# Patient Record
Sex: FEMALE | URBAN - METROPOLITAN AREA
[De-identification: names, ages, dates, MRNs, and addresses within clinical notes are randomized per-mention and may not be internally consistent; named-entity substitution may affect disease eponyms.]

---

## 2024-07-09 ENCOUNTER — TELEPHONE (OUTPATIENT)
Dept: FAMILY MEDICINE | Facility: CLINIC | Age: 44
End: 2024-07-09
Payer: COMMERCIAL

## 2024-07-09 NOTE — TELEPHONE ENCOUNTER
----- Message from Willie Montoya sent at 7/9/2024  2:31 PM CDT -----  Regarding: Deedee  Type:Patient Callback     Who called: Deedee     What is the request in detail: Pt stated that she has form to fax to the office. This will be her medical records from her fomer PCP. Please reach out to her with the correct fax number so that she can have it faxed over.     Can the clinic reply by [facility]? No      Would the patient rather a call back or a response via [facility]? Callback     Best call back number: [phone]      Additional Information:

## 2024-07-09 NOTE — TELEPHONE ENCOUNTER
Patient has appointment scheduled for 7/15/2024 with Dr. Bagley.  Would like to have past medical record faxed to office so that Dr. Bagley can review before visit.  Patient advised of fax number to office.

## 2024-07-15 ENCOUNTER — TELEPHONE (OUTPATIENT)
Dept: FAMILY MEDICINE | Facility: CLINIC | Age: 44
End: 2024-07-15
Payer: COMMERCIAL

## 2024-07-22 ENCOUNTER — OFFICE VISIT (OUTPATIENT)
Dept: FAMILY MEDICINE | Facility: CLINIC | Age: 44
End: 2024-07-22
Payer: COMMERCIAL

## 2024-07-22 VITALS
HEART RATE: 88 BPM | RESPIRATION RATE: 18 BRPM | TEMPERATURE: 98 F | SYSTOLIC BLOOD PRESSURE: 122 MMHG | DIASTOLIC BLOOD PRESSURE: 72 MMHG | WEIGHT: 119.06 LBS | OXYGEN SATURATION: 99 %

## 2024-07-22 DIAGNOSIS — Z01.419 WOMEN'S ANNUAL ROUTINE GYNECOLOGICAL EXAMINATION: ICD-10-CM

## 2024-07-22 DIAGNOSIS — E03.8 HYPOTHYROIDISM DUE TO HASHIMOTO'S THYROIDITIS: ICD-10-CM

## 2024-07-22 DIAGNOSIS — Z00.00 ANNUAL PHYSICAL EXAM: Primary | ICD-10-CM

## 2024-07-22 DIAGNOSIS — Z11.59 NEED FOR HEPATITIS C SCREENING TEST: ICD-10-CM

## 2024-07-22 DIAGNOSIS — Z12.11 COLON CANCER SCREENING: ICD-10-CM

## 2024-07-22 DIAGNOSIS — E06.3 HYPOTHYROIDISM DUE TO HASHIMOTO'S THYROIDITIS: ICD-10-CM

## 2024-07-22 DIAGNOSIS — Z11.4 SCREENING FOR HIV (HUMAN IMMUNODEFICIENCY VIRUS): ICD-10-CM

## 2024-07-22 DIAGNOSIS — Z12.31 BREAST CANCER SCREENING BY MAMMOGRAM: ICD-10-CM

## 2024-07-22 PROCEDURE — 99386 PREV VISIT NEW AGE 40-64: CPT | Mod: S$GLB,,, | Performed by: INTERNAL MEDICINE

## 2024-07-22 PROCEDURE — 1160F RVW MEDS BY RX/DR IN RCRD: CPT | Mod: CPTII,S$GLB,, | Performed by: INTERNAL MEDICINE

## 2024-07-22 PROCEDURE — 99999 PR PBB SHADOW E&M-EST. PATIENT-LVL IV: CPT | Mod: PBBFAC,,, | Performed by: INTERNAL MEDICINE

## 2024-07-22 PROCEDURE — 3078F DIAST BP <80 MM HG: CPT | Mod: CPTII,S$GLB,, | Performed by: INTERNAL MEDICINE

## 2024-07-22 PROCEDURE — 1159F MED LIST DOCD IN RCRD: CPT | Mod: CPTII,S$GLB,, | Performed by: INTERNAL MEDICINE

## 2024-07-22 PROCEDURE — 3074F SYST BP LT 130 MM HG: CPT | Mod: CPTII,S$GLB,, | Performed by: INTERNAL MEDICINE

## 2024-07-22 RX ORDER — LEVOTHYROXINE SODIUM 75 UG/1
75 TABLET ORAL
COMMUNITY
End: 2024-07-22 | Stop reason: CLARIF

## 2024-07-22 RX ORDER — LEVOTHYROXINE SODIUM 75 UG/1
75 TABLET ORAL
Qty: 90 TABLET | Refills: 3 | Status: SHIPPED | OUTPATIENT
Start: 2024-07-22 | End: 2025-07-22

## 2024-07-23 ENCOUNTER — LAB VISIT (OUTPATIENT)
Dept: LAB | Facility: HOSPITAL | Age: 44
End: 2024-07-23
Attending: INTERNAL MEDICINE
Payer: COMMERCIAL

## 2024-07-23 ENCOUNTER — TELEPHONE (OUTPATIENT)
Dept: FAMILY MEDICINE | Facility: CLINIC | Age: 44
End: 2024-07-23
Payer: COMMERCIAL

## 2024-07-23 DIAGNOSIS — Z11.59 NEED FOR HEPATITIS C SCREENING TEST: ICD-10-CM

## 2024-07-23 DIAGNOSIS — Z00.00 ANNUAL PHYSICAL EXAM: ICD-10-CM

## 2024-07-23 DIAGNOSIS — Z11.4 SCREENING FOR HIV (HUMAN IMMUNODEFICIENCY VIRUS): ICD-10-CM

## 2024-07-23 DIAGNOSIS — E06.3 HYPOTHYROIDISM DUE TO HASHIMOTO'S THYROIDITIS: ICD-10-CM

## 2024-07-23 DIAGNOSIS — E03.8 HYPOTHYROIDISM DUE TO HASHIMOTO'S THYROIDITIS: ICD-10-CM

## 2024-07-23 LAB
ALBUMIN SERPL BCP-MCNC: 3.9 G/DL (ref 3.5–5.2)
ALP SERPL-CCNC: 33 U/L (ref 55–135)
ALT SERPL W/O P-5'-P-CCNC: 12 U/L (ref 10–44)
ANION GAP SERPL CALC-SCNC: 6 MMOL/L (ref 8–16)
AST SERPL-CCNC: 16 U/L (ref 10–40)
BASOPHILS # BLD AUTO: 0.01 K/UL (ref 0–0.2)
BASOPHILS NFR BLD: 0.2 % (ref 0–1.9)
BILIRUB SERPL-MCNC: 0.3 MG/DL (ref 0.1–1)
BUN SERPL-MCNC: 16 MG/DL (ref 6–20)
CALCIUM SERPL-MCNC: 8.4 MG/DL (ref 8.7–10.5)
CHLORIDE SERPL-SCNC: 107 MMOL/L (ref 95–110)
CHOLEST SERPL-MCNC: 159 MG/DL (ref 120–199)
CHOLEST/HDLC SERPL: 2.3 {RATIO} (ref 2–5)
CO2 SERPL-SCNC: 24 MMOL/L (ref 23–29)
CREAT SERPL-MCNC: 0.8 MG/DL (ref 0.5–1.4)
DIFFERENTIAL METHOD BLD: NORMAL
EOSINOPHIL # BLD AUTO: 0.1 K/UL (ref 0–0.5)
EOSINOPHIL NFR BLD: 2.2 % (ref 0–8)
ERYTHROCYTE [DISTWIDTH] IN BLOOD BY AUTOMATED COUNT: 12.9 % (ref 11.5–14.5)
EST. GFR  (NO RACE VARIABLE): >60 ML/MIN/1.73 M^2
ESTIMATED AVG GLUCOSE: 91 MG/DL (ref 68–131)
GLUCOSE SERPL-MCNC: 82 MG/DL (ref 70–110)
HBA1C MFR BLD: 4.8 % (ref 4–5.6)
HCT VFR BLD AUTO: 38.9 % (ref 37–48.5)
HCV AB SERPL QL IA: NORMAL
HDLC SERPL-MCNC: 70 MG/DL (ref 40–75)
HDLC SERPL: 44 % (ref 20–50)
HGB BLD-MCNC: 12.6 G/DL (ref 12–16)
HIV 1+2 AB+HIV1 P24 AG SERPL QL IA: NORMAL
IMM GRANULOCYTES # BLD AUTO: 0.02 K/UL (ref 0–0.04)
IMM GRANULOCYTES NFR BLD AUTO: 0.3 % (ref 0–0.5)
LDLC SERPL CALC-MCNC: 79.8 MG/DL (ref 63–159)
LYMPHOCYTES # BLD AUTO: 1.7 K/UL (ref 1–4.8)
LYMPHOCYTES NFR BLD: 28.9 % (ref 18–48)
MCH RBC QN AUTO: 29.7 PG (ref 27–31)
MCHC RBC AUTO-ENTMCNC: 32.4 G/DL (ref 32–36)
MCV RBC AUTO: 92 FL (ref 82–98)
MONOCYTES # BLD AUTO: 0.6 K/UL (ref 0.3–1)
MONOCYTES NFR BLD: 9.9 % (ref 4–15)
NEUTROPHILS # BLD AUTO: 3.5 K/UL (ref 1.8–7.7)
NEUTROPHILS NFR BLD: 58.5 % (ref 38–73)
NONHDLC SERPL-MCNC: 89 MG/DL
NRBC BLD-RTO: 0 /100 WBC
PLATELET # BLD AUTO: 246 K/UL (ref 150–450)
PMV BLD AUTO: 10.4 FL (ref 9.2–12.9)
POTASSIUM SERPL-SCNC: 3.9 MMOL/L (ref 3.5–5.1)
PROT SERPL-MCNC: 7.1 G/DL (ref 6–8.4)
RBC # BLD AUTO: 4.24 M/UL (ref 4–5.4)
SODIUM SERPL-SCNC: 137 MMOL/L (ref 136–145)
T4 FREE SERPL-MCNC: 1 NG/DL (ref 0.71–1.51)
TRIGL SERPL-MCNC: 46 MG/DL (ref 30–150)
TSH SERPL DL<=0.005 MIU/L-ACNC: 1.14 UIU/ML (ref 0.4–4)
WBC # BLD AUTO: 5.98 K/UL (ref 3.9–12.7)

## 2024-07-23 PROCEDURE — 80061 LIPID PANEL: CPT | Performed by: INTERNAL MEDICINE

## 2024-07-23 PROCEDURE — 36415 COLL VENOUS BLD VENIPUNCTURE: CPT | Mod: PO | Performed by: INTERNAL MEDICINE

## 2024-07-23 PROCEDURE — 80053 COMPREHEN METABOLIC PANEL: CPT | Performed by: INTERNAL MEDICINE

## 2024-07-23 PROCEDURE — 85025 COMPLETE CBC W/AUTO DIFF WBC: CPT | Performed by: INTERNAL MEDICINE

## 2024-07-23 PROCEDURE — 86803 HEPATITIS C AB TEST: CPT | Performed by: INTERNAL MEDICINE

## 2024-07-23 PROCEDURE — 83036 HEMOGLOBIN GLYCOSYLATED A1C: CPT | Performed by: INTERNAL MEDICINE

## 2024-07-23 PROCEDURE — 84439 ASSAY OF FREE THYROXINE: CPT | Performed by: INTERNAL MEDICINE

## 2024-07-23 PROCEDURE — 87389 HIV-1 AG W/HIV-1&-2 AB AG IA: CPT | Performed by: INTERNAL MEDICINE

## 2024-07-23 PROCEDURE — 84443 ASSAY THYROID STIM HORMONE: CPT | Performed by: INTERNAL MEDICINE

## 2024-07-23 NOTE — PROGRESS NOTES
Chief Complaint: Establish Care (Pt would like to establish care and speak about a referral to endocrine  / high stress levels taking care of kids )      Deedee Ramirez  is a 43 y.o. year old patient who presents today for est care and annual    Patient has a history of hashimoto's, on Synthroid. Otherwise she will well but does have to take care of two children who have Autism.    Past Medical History:   Diagnosis Date    ADHD (attention deficit hyperactivity disorder)     Anxiety     Breast cancer     Depression     Hypothyroidism     PTSD (post-traumatic stress disorder)        History reviewed. No pertinent surgical history.     Family History   Problem Relation Name Age of Onset    Hypertension Mother      Depression Mother      Hyperlipidemia Mother      Pancreatic cancer Maternal Uncle          Social History     Socioeconomic History    Marital status:     Number of children: 2   Tobacco Use    Smoking status: Never    Smokeless tobacco: Never         Current Outpatient Medications:     levothyroxine (SYNTHROID) 75 MCG tablet, Take 1 tablet (75 mcg total) by mouth before breakfast., Disp: 90 tablet, Rfl: 3     Review of Systems   HENT:  Negative for congestion.    Eyes:  Negative for blurred vision.   Respiratory:  Negative for cough and shortness of breath.    Cardiovascular:  Negative for chest pain and leg swelling.   Gastrointestinal:  Negative for abdominal pain, blood in stool, constipation, diarrhea, melena and nausea.   Genitourinary:  Negative for dysuria.   Musculoskeletal:  Negative for joint pain.   Neurological:  Negative for headaches.   Psychiatric/Behavioral:  Negative for depression. The patient is not nervous/anxious.         Objective:      Vitals:    07/22/24 1518   BP: 122/72   Pulse: 88   Resp: 18   Temp: 98 °F (36.7 °C)       Physical Exam  Vitals and nursing note reviewed.   Constitutional:       Appearance: Normal appearance.   HENT:      Head: Normocephalic and atraumatic.    Cardiovascular:      Rate and Rhythm: Normal rate and regular rhythm.   Pulmonary:      Effort: Pulmonary effort is normal.      Breath sounds: Normal breath sounds. No wheezing or rales.   Abdominal:      Palpations: Abdomen is soft.      Tenderness: There is no abdominal tenderness.   Musculoskeletal:         General: Normal range of motion.      Right lower leg: No edema.      Left lower leg: No edema.   Skin:     General: Skin is warm and dry.   Neurological:      General: No focal deficit present.      Mental Status: She is alert and oriented to person, place, and time.   Psychiatric:         Mood and Affect: Mood normal.         Behavior: Behavior normal.          Assessment:       1. Annual physical exam    2. Hypothyroidism due to Hashimoto's thyroiditis    3. Women's annual routine gynecological examination    4. Screening for HIV (human immunodeficiency virus)    5. Need for hepatitis C screening test    6. Breast cancer screening by mammogram    7. Colon cancer screening          Plan:   1. Annual physical exam  Assessment & Plan:  Discussed HCM with patient  - Cologuard ordered  - annual labs ordered  - last pap smear on No result found, referral placed for Gyn for pap smear   - Mammogram: Met on 4/28/2023, mammogram ordered and scheduled   - advised patient to follow up with ophthalmologist and dentist every year  - reviewed medical, surgical, family and social history      Orders:  -     CBC Auto Differential; Future; Expected date: 07/22/2024  -     Comprehensive Metabolic Panel; Future; Expected date: 07/22/2024  -     Hemoglobin A1C; Future; Expected date: 07/22/2024  -     Lipid Panel; Future; Expected date: 07/22/2024    2. Hypothyroidism due to Hashimoto's thyroiditis  Assessment & Plan:  Chronic, stable     - TSH/T4  - cont synthroid 75 mcg daily    Orders:  -     Ambulatory referral/consult to Endocrinology; Future; Expected date: 07/29/2024  -     levothyroxine (SYNTHROID) 75 MCG tablet;  Take 1 tablet (75 mcg total) by mouth before breakfast.  Dispense: 90 tablet; Refill: 3  -     TSH; Future; Expected date: 07/22/2024  -     T4, Free; Future; Expected date: 07/22/2024    3. Women's annual routine gynecological examination  -     Ambulatory referral/consult to Gynecology; Future; Expected date: 07/29/2024    4. Screening for HIV (human immunodeficiency virus)  -     HIV 1/2 Ag/Ab (4th Gen); Future; Expected date: 07/22/2024    5. Need for hepatitis C screening test  -     Hepatitis C Antibody; Future; Expected date: 07/22/2024    6. Breast cancer screening by mammogram  -     Mammo Digital Screening Bilat w/ Wilfred; Future; Expected date: 07/22/2024    7. Colon cancer screening  -     Cologuard Screening (Multitarget Stool DNA); Future; Expected date: 07/22/2024         Follow up in about 1 year (around 7/22/2025).

## 2024-07-23 NOTE — ASSESSMENT & PLAN NOTE
Discussed HCM with patient  - Cologuard ordered  - annual labs ordered  - last pap smear on No result found, referral placed for Gyn for pap smear   - Mammogram: Met on 4/28/2023, mammogram ordered and scheduled   - advised patient to follow up with ophthalmologist and dentist every year  - reviewed medical, surgical, family and social history

## 2024-07-23 NOTE — TELEPHONE ENCOUNTER
Spoke to pharmacy regarding below; they were able to change to brand, medication not in stock so they will have to order; pt informed

## 2024-07-23 NOTE — TELEPHONE ENCOUNTER
----- Message from Trupti Bagley MD sent at 7/23/2024 10:37 AM CDT -----  Regarding: Synthroid  Please call patient's pharmacy and request Branded Synthroid. I tried placing it yesterday but it kept changing to generic. Thank you

## 2024-07-24 ENCOUNTER — PATIENT MESSAGE (OUTPATIENT)
Dept: FAMILY MEDICINE | Facility: CLINIC | Age: 44
End: 2024-07-24
Payer: COMMERCIAL

## 2024-09-04 ENCOUNTER — TELEPHONE (OUTPATIENT)
Dept: FAMILY MEDICINE | Facility: CLINIC | Age: 44
End: 2024-09-04
Payer: COMMERCIAL

## 2024-09-04 ENCOUNTER — TELEPHONE (OUTPATIENT)
Dept: OBSTETRICS AND GYNECOLOGY | Facility: CLINIC | Age: 44
End: 2024-09-04
Payer: COMMERCIAL

## 2024-09-04 NOTE — TELEPHONE ENCOUNTER
----- Message from Triston Haro sent at 9/4/2024 12:02 PM CDT -----  Regarding: self  Type: Patient Call Back    Who called:self    What is the request in detail:pt is calling to speak to the office to see of her medical records and referral was sent to a doctors office when I asked the reason    Can the clinic reply by [facility]?no    Would the patient rather a call back or a response via [facility]? callback    Best call back number:[phone]    Additional Information:

## 2024-09-04 NOTE — TELEPHONE ENCOUNTER
Pt call was returned.  Pt was giving the address to the office. Pt was asking if the provider needs her outside records before she can be seen.  Pt was told no she is go to come in  and if the provider needed anything we can request her records.     ----- Message from Carolina Crowell sent at 9/4/2024 12:14 PM CDT -----  Regarding: self [phone]  Type: Patient Call Back    Who called: self     What is the request in detail: pt is asking if office is needing her old medical records from prev doctors before she comes in?     Can the clinic reply by [facility]? No     Would the patient rather a call back or a response via [facility]? Call back     Best call back number: [phone]

## 2024-09-04 NOTE — TELEPHONE ENCOUNTER
Pt has appt with external endocrinologist on Monday and would like records sent; informed her I can fax most recent labs but if she wants her entire record she would need to go through medical record dept; pt verbalized understanding; ok with last labs being faxed

## 2024-10-21 PROBLEM — Z00.00 ANNUAL PHYSICAL EXAM: Status: RESOLVED | Noted: 2024-07-22 | Resolved: 2024-10-21

## 2024-11-15 ENCOUNTER — TELEPHONE (OUTPATIENT)
Dept: OBSTETRICS AND GYNECOLOGY | Facility: CLINIC | Age: 44
End: 2024-11-15
Payer: COMMERCIAL

## 2024-11-15 NOTE — TELEPHONE ENCOUNTER
Pt called to inform office that her child is sick and she may not come in on Monday.  She will call back on Monday morning to cancel is she is not able to come in at all. Pt was told that she can use the [facility] [employer] appt to cancel and reschedule her appt. Pt voiced understanding. livia    ----- Message from Ruby sent at 11/15/2024  3:25 PM CST -----  Regarding: Self  Type: Patient Call Back     What is the request in detail: Please call pt back with info on the time frame she needs to cancel her appt , her child is sick and she may not be able to make it on Monday.     Can the clinic reply by [facility]? No     Would the patient rather a call back or a response via [facility]? Call back    Best call back number: .[phone]      Additional Information:    Thank you.

## 2024-12-02 ENCOUNTER — TELEPHONE (OUTPATIENT)
Dept: OBSTETRICS AND GYNECOLOGY | Facility: CLINIC | Age: 44
End: 2024-12-02
Payer: COMMERCIAL

## 2024-12-02 NOTE — TELEPHONE ENCOUNTER
----- Message from Summer sent at 12/2/2024  3:15 PM CST -----  Regarding: appt  Type:  Patient Returning Call        Name of who is calling:Pt        What is request in detail:Pt is requesting a call back in regards to appt, pt states that she had a appt but cancelled it due to sick child and is trying to reschedule. Pt would like to seen if possible due to recent yeast infection that she had. Pt wants to make sure that it is all clear up.        Can clinic reply by [facility]:no        [facility]:[phone]

## 2024-12-03 ENCOUNTER — LAB VISIT (OUTPATIENT)
Dept: LAB | Facility: HOSPITAL | Age: 44
End: 2024-12-03
Attending: STUDENT IN AN ORGANIZED HEALTH CARE EDUCATION/TRAINING PROGRAM
Payer: COMMERCIAL

## 2024-12-03 ENCOUNTER — OFFICE VISIT (OUTPATIENT)
Dept: OBSTETRICS AND GYNECOLOGY | Facility: CLINIC | Age: 44
End: 2024-12-03
Payer: COMMERCIAL

## 2024-12-03 VITALS
DIASTOLIC BLOOD PRESSURE: 76 MMHG | HEIGHT: 59 IN | BODY MASS INDEX: 26.2 KG/M2 | WEIGHT: 129.94 LBS | SYSTOLIC BLOOD PRESSURE: 124 MMHG

## 2024-12-03 DIAGNOSIS — N76.0 ACUTE VULVOVAGINITIS: ICD-10-CM

## 2024-12-03 DIAGNOSIS — R30.0 DYSURIA: ICD-10-CM

## 2024-12-03 DIAGNOSIS — Z85.3 HISTORY OF BREAST CANCER: ICD-10-CM

## 2024-12-03 DIAGNOSIS — R15.9 INCONTINENCE OF FECES, UNSPECIFIED FECAL INCONTINENCE TYPE: ICD-10-CM

## 2024-12-03 DIAGNOSIS — Z01.419 WELL WOMAN EXAM WITH ROUTINE GYNECOLOGICAL EXAM: Primary | ICD-10-CM

## 2024-12-03 LAB
BACTERIA #/AREA URNS HPF: NORMAL /HPF
BILIRUB UR QL STRIP: NEGATIVE
CLARITY UR: CLEAR
COLOR UR: YELLOW
GLUCOSE UR QL STRIP: NEGATIVE
HGB UR QL STRIP: ABNORMAL
KETONES UR QL STRIP: NEGATIVE
LEUKOCYTE ESTERASE UR QL STRIP: ABNORMAL
MICROSCOPIC COMMENT: NORMAL
NITRITE UR QL STRIP: NEGATIVE
PH UR STRIP: 7 [PH] (ref 5–8)
PROT UR QL STRIP: NEGATIVE
RBC #/AREA URNS HPF: 2 /HPF (ref 0–4)
SP GR UR STRIP: 1.02 (ref 1–1.03)
SQUAMOUS #/AREA URNS HPF: 4 /HPF
URN SPEC COLLECT METH UR: ABNORMAL
UROBILINOGEN UR STRIP-ACNC: NEGATIVE EU/DL
WBC #/AREA URNS HPF: 2 /HPF (ref 0–5)

## 2024-12-03 PROCEDURE — 3078F DIAST BP <80 MM HG: CPT | Mod: CPTII,S$GLB,, | Performed by: STUDENT IN AN ORGANIZED HEALTH CARE EDUCATION/TRAINING PROGRAM

## 2024-12-03 PROCEDURE — 1159F MED LIST DOCD IN RCRD: CPT | Mod: CPTII,S$GLB,, | Performed by: STUDENT IN AN ORGANIZED HEALTH CARE EDUCATION/TRAINING PROGRAM

## 2024-12-03 PROCEDURE — 3074F SYST BP LT 130 MM HG: CPT | Mod: CPTII,S$GLB,, | Performed by: STUDENT IN AN ORGANIZED HEALTH CARE EDUCATION/TRAINING PROGRAM

## 2024-12-03 PROCEDURE — 99386 PREV VISIT NEW AGE 40-64: CPT | Mod: S$GLB,,, | Performed by: STUDENT IN AN ORGANIZED HEALTH CARE EDUCATION/TRAINING PROGRAM

## 2024-12-03 PROCEDURE — 99999 PR PBB SHADOW E&M-EST. PATIENT-LVL III: CPT | Mod: PBBFAC,,, | Performed by: STUDENT IN AN ORGANIZED HEALTH CARE EDUCATION/TRAINING PROGRAM

## 2024-12-03 PROCEDURE — 1160F RVW MEDS BY RX/DR IN RCRD: CPT | Mod: CPTII,S$GLB,, | Performed by: STUDENT IN AN ORGANIZED HEALTH CARE EDUCATION/TRAINING PROGRAM

## 2024-12-03 PROCEDURE — 3008F BODY MASS INDEX DOCD: CPT | Mod: CPTII,S$GLB,, | Performed by: STUDENT IN AN ORGANIZED HEALTH CARE EDUCATION/TRAINING PROGRAM

## 2024-12-03 PROCEDURE — 81000 URINALYSIS NONAUTO W/SCOPE: CPT | Performed by: STUDENT IN AN ORGANIZED HEALTH CARE EDUCATION/TRAINING PROGRAM

## 2024-12-03 PROCEDURE — 0352U VAGINOSIS SCREEN BY DNA PROBE: CPT | Performed by: STUDENT IN AN ORGANIZED HEALTH CARE EDUCATION/TRAINING PROGRAM

## 2024-12-03 PROCEDURE — 3044F HG A1C LEVEL LT 7.0%: CPT | Mod: CPTII,S$GLB,, | Performed by: STUDENT IN AN ORGANIZED HEALTH CARE EDUCATION/TRAINING PROGRAM

## 2024-12-03 RX ORDER — FLUCONAZOLE 150 MG/1
150 TABLET ORAL DAILY
Qty: 1 TABLET | Refills: 0 | Status: SHIPPED | OUTPATIENT
Start: 2024-12-03 | End: 2024-12-04

## 2024-12-03 NOTE — PROGRESS NOTES
Subjective     Patient ID: Deedee Ramirez is a 44 y.o. female.    Chief Complaint:  Well Woman (Yeast infection)      History of Present Illness  43 yo  presents to establish care and discuss vaginitis sx    Thinks she had a yeast infection recently, did not respond well to monistat 1. Today noticed some white residue on vulva   Also noting some burning with urination recently    History of triple negative breast cancer s/p chemo at age 33. Still having monthly cycles, usually predictable but for the most part 3-4 weeks apart.   Had tried menstrual discs before but did not work well for her. Now using tampons. Condoms for birth control. Denies pain with sex    Report some fecal incontinence that started after 2nd pregnancy, no urinary incontinence. Has not bothered her enough to where it's been a problem  History of ruptured ovarian cysts in the past. Had LEEP in 20s        GYN & OB History  Patient's last menstrual period was 11/10/2024.   Date of Last Pap: 12/3/2024    OB History    Para Term  AB Living   3 2     1 2   SAB IAB Ectopic Multiple Live Births                  # Outcome Date GA Lbr Carl/2nd Weight Sex Type Anes PTL Lv   3 Para 2017     Vag-Spont      2 Para 2013     Vag-Spont      1 AB                Review of Systems  Review of Systems   All other systems reviewed and are negative.         Objective   Physical Exam:   Constitutional: She appears well-developed and well-nourished.    HENT:   Head: Normocephalic and atraumatic.    Eyes: Conjunctivae and EOM are normal.      Pulmonary/Chest: Effort normal. Right breast exhibits no mass and no nipple discharge. Left breast exhibits no mass and no nipple discharge.   Well healed lumpectomy scar            Abdominal: Soft.     Genitourinary:    Uterus, right adnexa and left adnexa normal.      Pelvic exam was performed with patient in the lithotomy position.   The external female genitalia was normal.   There is no lesion on the right labia.  There is no lesion on the left labia. Cervix is normal. Right adnexum displays no mass. Left adnexum displays no mass. There is vaginal discharge in the vagina. Cervix exhibits friability.    pap smear completed   Genitourinary Comments: Female chaperone was present for duration of exam             Musculoskeletal: Normal range of motion.       Neurological: She is alert.    Skin: Skin is warm and dry.    Psychiatric: She has a normal mood and affect. Her behavior is normal.            Assessment and Plan     1. Well woman exam with routine gynecological exam    2. Dysuria    3. Acute vulvovaginitis    4. Incontinence of feces, unspecified fecal incontinence type        Plan:  1. Well woman exam with routine gynecological exam (Primary)  - Pap and HPV done today.  - Screening tests as ordered.  - Condom use encouraged for STD prevention.  Reviewed ASCCP Pap guidelines and screening recommendations.    Counseling: Contraception:condoms  HPV vaccine  Smoking  Health Screens: Mammography  Colonoscopy:discussed  Health Maintenance reviewed  Perimenopause/Menopause  - Liquid-Based Pap Smear, Screening  - HPV High Risk Genotypes, PCR  - discussed menopause sx, had severe hot flashes during chemo/radiation but not very bad right now.    2. Dysuria  - Urinalysis, Reflex to Urine Culture Urine, Clean Catch    3. Acute vulvovaginitis  - Vaginosis Screen by DNA Probe; Future  - fluconazole (DIFLUCAN) 150 MG Tab; Take 1 tablet (150 mg total) by mouth once daily. for 1 day  Dispense: 1 tablet; Refill: 0    4. Incontinence of feces, unspecified fecal incontinence type  - Ambulatory referral/consult to Physical/Occupational Therapy; Future    5. History of breast cancer  - Last saw Med Onc 4/2023 in [place], no longer needs speciality follow up, just CBE and mammo yearly.  - No longer needs MRI/mammo, just annual screening mammogram     Follow up in 1 year for WWE or sooner as needed     Goyo Narayan III, MD  [facility] [place] - OB GYN    [address]  [facility] [id number] [phone]

## 2024-12-04 LAB
BACTERIAL VAGINOSIS DNA: NOT DETECTED
CANDIDA GLABRATA/KRUSEI: NOT DETECTED
CANDIDA RRNA VAG QL PROBE: NOT DETECTED
TRICHOMONAS VAGINALIS: NOT DETECTED

## 2024-12-05 ENCOUNTER — PATIENT MESSAGE (OUTPATIENT)
Dept: OBSTETRICS AND GYNECOLOGY | Facility: CLINIC | Age: 44
End: 2024-12-05
Payer: COMMERCIAL

## 2025-01-09 ENCOUNTER — E-VISIT (OUTPATIENT)
Dept: OBSTETRICS AND GYNECOLOGY | Facility: CLINIC | Age: 45
End: 2025-01-09
Payer: COMMERCIAL

## 2025-01-09 ENCOUNTER — TELEPHONE (OUTPATIENT)
Dept: OBSTETRICS AND GYNECOLOGY | Facility: CLINIC | Age: 45
End: 2025-01-09
Payer: COMMERCIAL

## 2025-01-09 ENCOUNTER — PATIENT MESSAGE (OUTPATIENT)
Dept: OBSTETRICS AND GYNECOLOGY | Facility: CLINIC | Age: 45
End: 2025-01-09

## 2025-01-09 DIAGNOSIS — N89.8 VAGINAL ITCHING: Primary | ICD-10-CM

## 2025-01-09 RX ORDER — FLUCONAZOLE 150 MG/1
150 TABLET ORAL DAILY
Qty: 1 TABLET | Refills: 0 | Status: SHIPPED | OUTPATIENT
Start: 2025-01-09 | End: 2025-01-10

## 2025-01-09 NOTE — PROGRESS NOTES
Patient ID: Deedee Ramirez is a 44 y.o. female.    Chief Complaint: General Illness (Entered automatically based on patient selection in [employer].)    The patient initiated a request through [employer] on 1/9/2025 for evaluation and management with a chief complaint of General Illness (Entered automatically based on patient selection in [employer].)     I evaluated the questionnaire submission on 1/9/25.    [facility]    1/9/2025  9:31 AM CST - Filed by Patient   What do you need help with? Vaginal Concerns   Do you agree to participate in an E-Visit? Yes   If you have any of the following symptoms,  please do not complete an E-Visit,  schedule an appointment with your provider: I acknowledge   Do you have any of the following pregnancy-related conditions? None   What is the main issue you would like addressed today? Vaginal discomfort due to probable yeast infection   Which of the following vaginal concerns do you have? Itching;  Pain   Do you have vaginal discharge? No discharge   Do you have pain while passing urine? No   Do you have any of the following symptoms? None of the above    Have you taken antibiotics in the last two weeks? No    Do you use any of the following? No   Which of the following applies to your menstrual period? Expect soon   Which of the following applies to your menstrual cycle? Normal amount of bleeding   Do you have spotting between periods? No   Do you have pain with your period? No   Have you had similar symptoms in the past? More than once   When you had similar symptoms in the past, did any of the following work? Pills for yeast infection   Have you had a temperature of 100.4 or higher? No   Provide any additional information you feel is important. Would like fluconazole. Symptoms are similar to yeast infection I had in November that I treated with Monistat 1 because it occurred on a weekend. I prefer oral treatment because it is easier, , and my period is due  any time now.   Please attach any relevant images or files    Are you able to take your vital signs? No         Encounter Diagnosis   Name Primary?    Vaginal itching Yes        No orders of the defined types were placed in this encounter.     Medications Ordered This Encounter   Medications    fluconazole (DIFLUCAN) 150 MG Tab     Sig: Take 1 tablet (150 mg total) by mouth once daily. for 1 day     Dispense:  1 tablet     Refill:  0        No follow-ups on file.      E-Visit Time Tracking:    Day 1 Time (in minutes): 5    Total Time (in minutes): 5

## 2025-01-09 NOTE — TELEPHONE ENCOUNTER
Responded to pt via portal. Link for e-visit sent. Pt access liaison confirmed pt's preferred pharmacy.     ----- Message from Carolina sent at 2025  8:03 AM CST -----  [phone]  Type: Patient Call Back    Who called: self    What is the request in detail: pt stated she is experiencing symptoms of yeast infection, she asked if she can have an RX or if she needs to come in?     Can the clinic reply by [facility]? no    Would the patient rather a call back or a response via [facility]? Call back      Best call back number: [phone]      [facility]/pharmacy #[id number] - [place] - [address]  [address] [address]  Phone: [phone] Fax: [phone]

## 2025-01-13 ENCOUNTER — TELEPHONE (OUTPATIENT)
Dept: OBSTETRICS AND GYNECOLOGY | Facility: CLINIC | Age: 45
End: 2025-01-13
Payer: COMMERCIAL

## 2025-01-13 NOTE — TELEPHONE ENCOUNTER
"Called and spoke to pt. Scheduled appt for 1/14 at 9:15a. Will f/u with provider    ----- Message from Latricia sent at 1/13/2025  1:05 PM CST -----  Type:  Needs Medical Advice    Who Called: Patient  Best Call Back Number: [phone]  Additional Information: Patient is requesting a call back in regards to her evisit reply: as follows    "I took the fluconazole as soon as it was ready at the pharmacy on Thursday. I was in a lot of pain and did not notice improvement until Sunday morning, but it was a huge improvement. I do, however, still have some residual itching/discomfort. I was using Vagisil maximum strength until Sunday morning. Would you recommend another dose of fluconazole since I still have some residual symptoms? I also noticed you may be able to prescribe a more effective/appropriate topical anesthestic than Vagisil, and I would prefer that. Please note I've shown some tolerance to local anesthesia in the past and often need a larger and more frequent than normal dose, probably due to being a redhead."  "

## 2025-01-14 ENCOUNTER — OFFICE VISIT (OUTPATIENT)
Dept: OBSTETRICS AND GYNECOLOGY | Facility: CLINIC | Age: 45
End: 2025-01-14
Payer: COMMERCIAL

## 2025-01-14 VITALS — BODY MASS INDEX: 25.76 KG/M2 | WEIGHT: 127.56 LBS

## 2025-01-14 DIAGNOSIS — N92.6 IRREGULAR MENSTRUAL CYCLE: ICD-10-CM

## 2025-01-14 DIAGNOSIS — N76.0 ACUTE VULVOVAGINITIS: Primary | ICD-10-CM

## 2025-01-14 LAB
B-HCG UR QL: NEGATIVE
BACTERIA #/AREA URNS HPF: ABNORMAL /HPF
BILIRUB SERPL-MCNC: ABNORMAL MG/DL
BILIRUB UR QL STRIP: NEGATIVE
BLOOD URINE, POC: 50
CLARITY UR: ABNORMAL
CLARITY, POC UA: CLEAR
COLOR UR: YELLOW
COLOR, POC UA: ABNORMAL
CTP QC/QA: YES
GLUCOSE UR QL STRIP: ABNORMAL
GLUCOSE UR QL STRIP: NEGATIVE
HGB UR QL STRIP: ABNORMAL
KETONES UR QL STRIP: ABNORMAL
KETONES UR QL STRIP: NEGATIVE
LEUKOCYTE ESTERASE UR QL STRIP: ABNORMAL
LEUKOCYTE ESTERASE URINE, POC: ABNORMAL
MICROSCOPIC COMMENT: ABNORMAL
NITRITE UR QL STRIP: NEGATIVE
NITRITE, POC UA: ABNORMAL
PH UR STRIP: 6 [PH] (ref 5–8)
PH, POC UA: 7
PROT UR QL STRIP: NEGATIVE
PROTEIN, POC: ABNORMAL
RBC #/AREA URNS HPF: 11 /HPF (ref 0–4)
SP GR UR STRIP: 1.02 (ref 1–1.03)
SPECIFIC GRAVITY, POC UA: 1015
SQUAMOUS #/AREA URNS HPF: 25 /HPF
URN SPEC COLLECT METH UR: ABNORMAL
UROBILINOGEN UR STRIP-ACNC: NEGATIVE EU/DL
UROBILINOGEN, POC UA: ABNORMAL
WBC #/AREA URNS HPF: 56 /HPF (ref 0–5)

## 2025-01-14 PROCEDURE — 99214 OFFICE O/P EST MOD 30 MIN: CPT | Mod: S$GLB,,, | Performed by: STUDENT IN AN ORGANIZED HEALTH CARE EDUCATION/TRAINING PROGRAM

## 2025-01-14 PROCEDURE — 1160F RVW MEDS BY RX/DR IN RCRD: CPT | Mod: CPTII,S$GLB,, | Performed by: STUDENT IN AN ORGANIZED HEALTH CARE EDUCATION/TRAINING PROGRAM

## 2025-01-14 PROCEDURE — 3008F BODY MASS INDEX DOCD: CPT | Mod: CPTII,S$GLB,, | Performed by: STUDENT IN AN ORGANIZED HEALTH CARE EDUCATION/TRAINING PROGRAM

## 2025-01-14 PROCEDURE — 81000 URINALYSIS NONAUTO W/SCOPE: CPT | Performed by: STUDENT IN AN ORGANIZED HEALTH CARE EDUCATION/TRAINING PROGRAM

## 2025-01-14 PROCEDURE — 81025 URINE PREGNANCY TEST: CPT | Mod: S$GLB,,, | Performed by: STUDENT IN AN ORGANIZED HEALTH CARE EDUCATION/TRAINING PROGRAM

## 2025-01-14 PROCEDURE — 81002 URINALYSIS NONAUTO W/O SCOPE: CPT | Mod: S$GLB,,, | Performed by: STUDENT IN AN ORGANIZED HEALTH CARE EDUCATION/TRAINING PROGRAM

## 2025-01-14 PROCEDURE — 87086 URINE CULTURE/COLONY COUNT: CPT | Performed by: STUDENT IN AN ORGANIZED HEALTH CARE EDUCATION/TRAINING PROGRAM

## 2025-01-14 PROCEDURE — 1159F MED LIST DOCD IN RCRD: CPT | Mod: CPTII,S$GLB,, | Performed by: STUDENT IN AN ORGANIZED HEALTH CARE EDUCATION/TRAINING PROGRAM

## 2025-01-14 PROCEDURE — 81515 NFCT DS BV&VAGINITIS DNA ALG: CPT | Performed by: STUDENT IN AN ORGANIZED HEALTH CARE EDUCATION/TRAINING PROGRAM

## 2025-01-14 PROCEDURE — 99999 PR PBB SHADOW E&M-EST. PATIENT-LVL III: CPT | Mod: PBBFAC,,, | Performed by: STUDENT IN AN ORGANIZED HEALTH CARE EDUCATION/TRAINING PROGRAM

## 2025-01-14 RX ORDER — FLUCONAZOLE 150 MG/1
150 TABLET ORAL DAILY
Qty: 1 TABLET | Refills: 0 | Status: SHIPPED | OUTPATIENT
Start: 2025-01-14 | End: 2025-01-15

## 2025-01-14 RX ORDER — FLUCONAZOLE 150 MG/1
150 TABLET ORAL
Qty: 2 TABLET | Refills: 0 | Status: SHIPPED | OUTPATIENT
Start: 2025-01-14 | End: 2025-01-18

## 2025-01-14 RX ORDER — TERCONAZOLE 4 MG/G
1 CREAM VAGINAL NIGHTLY
Qty: 45 G | Refills: 0 | Status: SHIPPED | OUTPATIENT
Start: 2025-01-14 | End: 2025-01-24

## 2025-01-14 NOTE — PROGRESS NOTES
Subjective     Patient ID: Deedee Ramirez is a 44 y.o. female.    Chief Complaint:  Vaginitis      History of Present Illness  45 yo presents to discuss vaginitis sx    Took one dose of fluconazole as prescribed and did not feel relief until a few days later. Also using vagisil itch relief max strength  Feels better now but still experiencing itching/irritation.     Using condoms and water based lubes, trying to find ones without glycerine or other additives that can be triggering    Also noting she is 11 days late for her cycle. Wondering if this is due to perimenopause. History of chemo/triple negative BC 10 years ago      GYN & OB History  Patient's last menstrual period was 2024.   Date of Last Pap: 2024    OB History    Para Term  AB Living   3 2     1 2   SAB IAB Ectopic Multiple Live Births                  # Outcome Date GA Lbr Carl/2nd Weight Sex Type Anes PTL Lv   3 Para 2017     Vag-Spont      2 Para 2013     Vag-Spont      1 AB                Review of Systems  Review of Systems   Genitourinary:  Positive for vaginal discharge, vaginal pain and vaginal dryness.   All other systems reviewed and are negative.         Objective   Physical Exam:   Constitutional: She appears well-developed and well-nourished.    HENT:   Head: Normocephalic and atraumatic.    Eyes: EOM are normal.      Pulmonary/Chest: Effort normal.        Abdominal: Soft.     Genitourinary:    Right adnexa and left adnexa normal.      Pelvic exam was performed with patient in the lithotomy position.   The external female genitalia was normal.   Genitalia hair distrobution normal .   There is no lesion on the right labia. There is no lesion on the left labia. There is tenderness in the vagina.    Genitourinary Comments: Female chaperone present for duration of exam             Musculoskeletal: Normal range of motion.       Neurological: She is alert.    Skin: Skin is warm and dry.    Psychiatric: She has a normal mood  and affect.         Recent Labs   Lab Result Units 01/14/25  0934   POC Preg Test, Ur  Negative          Assessment and Plan     1. Acute vulvovaginitis    2. Irregular menstrual cycle          Plan:  1. Acute vulvovaginitis (Primary)  - will prescribe 3 day course of fluconazole and external cream. Trial of topical lidocaine  - discussed alternative options such as boric acid suppositories, reviewed ACOG statement on topical hormone treatment in the setting of hormone sensitive breast cancers (pt had negative receptor status 10 years ago)  - continue with glycerine/additive free lubes if possible.   - Vulvovaginal hygiene and care measures reviewed including: wearing cotton underwear, avoiding feminine products and irritants such as tight synthetic clothing, damp clothing     - Vaginosis Screen by DNA Probe; Future  - fluconazole (DIFLUCAN) 150 MG Tab; Take 1 tablet (150 mg total) by mouth once daily. for 1 day  Dispense: 1 tablet; Refill: 0  - LIDOcaine 4 % Gel; Apply 1 each topically 2 (two) times a day.  Dispense: 30 g  - terconazole (TERAZOL 7) 0.4 % Crea; Place 1 applicator vaginally every evening. Apply externally to affected area twice daily for 10 days  Dispense: 45 g; Refill: 0  - Urinalysis, Reflex to Urine Culture Urine, Clean Catch  - Vaginosis Screen by DNA Probe    2. Irregular menstrual cycle  - UPT neg today, continue to monitor menstrual cycles  - POCT URINE DIPSTICK WITHOUT MICROSCOPE  - POCT Urine Pregnancy      Follow up as needed     Goyo Narayan III, MD  [facility] - OB GYN   [address] [place] [id number] [phone]

## 2025-01-16 LAB
BACTERIA UR CULT: NORMAL
BACTERIAL VAGINOSIS DNA: NOT DETECTED
CANDIDA GLABRATA/KRUSEI: NOT DETECTED
CANDIDA RRNA VAG QL PROBE: NOT DETECTED
TRICHOMONAS VAGINALIS: NOT DETECTED

## 2025-02-10 ENCOUNTER — E-VISIT (OUTPATIENT)
Dept: OBSTETRICS AND GYNECOLOGY | Facility: CLINIC | Age: 45
End: 2025-02-10
Payer: COMMERCIAL

## 2025-02-10 DIAGNOSIS — N76.0 ACUTE VULVOVAGINITIS: Primary | ICD-10-CM

## 2025-02-10 RX ORDER — FLUCONAZOLE 150 MG/1
150 TABLET ORAL
Qty: 2 TABLET | Refills: 0 | Status: SHIPPED | OUTPATIENT
Start: 2025-02-10 | End: 2025-02-14

## 2025-02-10 NOTE — PROGRESS NOTES
Patient ID: Deedee Ramirez is a 44 y.o. female.    Chief Complaint: General Illness (Entered automatically based on patient selection in [email or web address].)    The patient initiated a request through [email or web address] on 2/10/2025 for evaluation and management with a chief complaint of General Illness (Entered automatically based on patient selection in [email or web address].)     I evaluated the questionnaire submission on 2/10/25.    [facility] P[facility]    2/10/2025  8:05 AM CST - Filed by Patient   What do you need help with? Vaginal Concerns   Do you agree to participate in an E-Visit? Yes   If you have any of the following symptoms,  please do not complete an E-Visit,  schedule an appointment with your provider: I acknowledge   Do you have any of the following pregnancy-related conditions? None   What is the main issue you would like addressed today? yeast infection   Which of the following vaginal concerns do you have? Discharge;  Itching;  Pain   Do you have vaginal discharge? White/milky discharge   Do you have pain while passing urine? No   Do you have any of the following symptoms? None of the above    Have you taken antibiotics in the last two weeks? No    Do you use any of the following? No   Which of the following applies to your menstrual period? Periods are unpredictable   Which of the following applies to your menstrual cycle? Normal amount of bleeding   Do you have spotting between periods? No   Do you have pain with your period? No   Have you had similar symptoms in the past? Frequently   When you had similar symptoms in the past, did any of the following work? Pills for yeast infection   Have you had a temperature of 100.4 or higher? No   Provide any additional information you feel is important. Symptoms are currently mild. I would like a dose of fluconazole as soon as possible, so they don't worsen.   Please attach any relevant images or files    Are you able to take your vital signs? No         Encounter Diagnosis    Name Primary?    Acute vulvovaginitis Yes        No orders of the defined types were placed in this encounter.     Medications Ordered This Encounter   Medications    fluconazole (DIFLUCAN) 150 MG Tab     Sig: Take 1 tablet (150 mg total) by mouth every 72 hours. for 2 doses     Dispense:  2 tablet     Refill:  0        No follow-ups on file.      E-Visit Time Tracking:

## 2025-02-19 ENCOUNTER — PATIENT MESSAGE (OUTPATIENT)
Dept: OBSTETRICS AND GYNECOLOGY | Facility: CLINIC | Age: 45
End: 2025-02-19
Payer: COMMERCIAL

## 2025-02-19 ENCOUNTER — OFFICE VISIT (OUTPATIENT)
Dept: OBSTETRICS AND GYNECOLOGY | Facility: CLINIC | Age: 45
End: 2025-02-19
Payer: COMMERCIAL

## 2025-02-19 VITALS
WEIGHT: 120.56 LBS | DIASTOLIC BLOOD PRESSURE: 82 MMHG | HEIGHT: 59 IN | BODY MASS INDEX: 24.31 KG/M2 | SYSTOLIC BLOOD PRESSURE: 124 MMHG

## 2025-02-19 DIAGNOSIS — N95.8 GENITOURINARY SYNDROME OF MENOPAUSE: ICD-10-CM

## 2025-02-19 DIAGNOSIS — C50.919 TRIPLE NEGATIVE BREAST CANCER: ICD-10-CM

## 2025-02-19 DIAGNOSIS — N76.0 ACUTE VULVOVAGINITIS: Primary | ICD-10-CM

## 2025-02-19 DIAGNOSIS — Z17.421 TRIPLE NEGATIVE BREAST CANCER: ICD-10-CM

## 2025-02-19 PROCEDURE — 81515 NFCT DS BV&VAGINITIS DNA ALG: CPT | Performed by: STUDENT IN AN ORGANIZED HEALTH CARE EDUCATION/TRAINING PROGRAM

## 2025-02-19 RX ORDER — CLOTRIMAZOLE AND BETAMETHASONE DIPROPIONATE 10; .64 MG/G; MG/G
CREAM TOPICAL 2 TIMES DAILY
Qty: 45 G | Refills: 1 | Status: SHIPPED | OUTPATIENT
Start: 2025-02-19

## 2025-02-19 RX ORDER — FLUCONAZOLE 150 MG/1
150 TABLET ORAL DAILY
Qty: 1 TABLET | Refills: 0 | Status: SHIPPED | OUTPATIENT
Start: 2025-02-19 | End: 2025-02-20

## 2025-02-19 NOTE — PROGRESS NOTES
Subjective     Patient ID: Deedee Ramirez is a 44 y.o. female.    Chief Complaint:  Gynecologic Exam (Yeast)      History of Present Illness  43 yo presents to discuss vaginitis sx and vulvar irritatoin    Today concerned about external vulvar skin burning, symptoms have largely resolved internally.   Sx currently worse externally, feels like sunburn. Concerned that there may be an infectious cause     Since last appointment:  Yeast infection cleared up but it took some time after 4 doses of fluconazole  Had a menstrual cycle on , it was 18 days late but resembled a usual period  Had sx of yeast infection again and followed up an Evisit to discuss this on 2/10, took 2 doses of fluconazole and has not had much relief.     Reports history of HSV-1 positive serology when she was pregnant. Once had a suspicious vulvar/vaginal lesion but said it was negative for herpes    Does not note any hot flashes yet but thinks she's experienced mood swings  Is concerned about applying a vaginal estrogen cream regularly and messy products like a cream. Does not like the idea of having to apply something regularly for a long time   Had issues with menstrual discs staying in place and is also not sure if a vaginal estrogen ring would work for her          GYN & OB History  Patient's last menstrual period was 2025.   Date of Last Pap: 2024    OB History    Para Term  AB Living   3 2   1 2   SAB IAB Ectopic Multiple Live Births             # Outcome Date GA Lbr Carl/2nd Weight Sex Type Anes PTL Lv   3 Para 2017     Vag-Spont      2 Para 2013     Vag-Spont      1 AB                Review of Systems  Review of Systems   Genitourinary:  Positive for vaginal pain.   All other systems reviewed and are negative.         Objective   Physical Exam:   Constitutional: She appears well-developed and well-nourished.    HENT:   Head: Normocephalic and atraumatic.    Eyes: EOM are normal.      Pulmonary/Chest: Effort  normal.        Abdominal: Soft.     Genitourinary:    Vagina, uterus, right adnexa and left adnexa normal.      Pelvic exam was performed with patient in the lithotomy position.   The external female genitalia was normal.   Genitalia hair distrobution normal .   There is no lesion on the right labia. There is no lesion on the left labia. Cervix is normal.    Genitourinary Comments: Female chaperone present for duration of exam    No lesions concerning for herpes outbreak noted             Musculoskeletal: Normal range of motion.       Neurological: She is alert.    Skin: Skin is warm and dry.    Psychiatric: She has a normal mood and affect.            Assessment and Plan     1. Acute vulvovaginitis    2. Genitourinary syndrome of menopause    3. Triple negative breast cancer        Plan:  1. Acute vulvovaginitis (Primary)  - clotrimazole-betamethasone 1-0.05% (LOTRISONE) cream; Apply topically 2 (two) times daily.  Dispense: 45 g; Refill: 1  - Vaginosis Screen by DNA Probe  - fluconazole (DIFLUCAN) 150 MG Tab; Take 1 tablet (150 mg total) by mouth once daily. for 1 day  Dispense: 1 tablet; Refill: 0    2. Genitourinary syndrome of menopause  - Discussed at length that patient's persistent symptoms not negative vaginal infectious swab raises suspicion for GSM. Discussed pathophysiology of this condition and that estrogen replacement in the form of cream, tablet, ring is often very beneficial and can reduce incidence of yeast infections if this is the cause of her symptoms.  - discussed that systemic HRT would not be as effective as local topical treatment for these symptoms, also as she is still having menstrual cycles and not having vasomotor symptoms, unclear if she would benefit from traditional HRT.   - Pt does not like the idea of having to apply a medication regularly for symptom relief.   - Had a mallory discussion that improvement in sx may take some work on her end and that I recommend referral to woman's  survivorship to discuss navigating menopause and perimenopausal symptoms in the setting remote history of breast cancer and chemotherapy.  - Pt also reports she has several events coming up that may make it difficult to start daily topical estrogen for 2 weeks, would like to consider waiting until after [place] and trying out the vaginal ring and applying the estrogen cream externally as needed.  - discussed role of probiotics, vaginal moisturizers and lubricants. Uses condoms for birth control and is concerned about any ingredients in vaginal estrogen interfering with this.  - Ambulatory referral/consult to Women's Wellness and Survivorship; Future    3. Triple negative breast cancer  - Ambulatory referral/consult to Women's Wellness and Survivorship; Future      Follow up as needed     Goyo Narayan III, MD  [facility] - [place] - OB GYN   [address]  [place] [id number] [phone]

## 2025-02-20 ENCOUNTER — PATIENT MESSAGE (OUTPATIENT)
Dept: OBSTETRICS AND GYNECOLOGY | Facility: CLINIC | Age: 45
End: 2025-02-20
Payer: COMMERCIAL

## 2025-02-26 ENCOUNTER — RESULTS FOLLOW-UP (OUTPATIENT)
Dept: OBSTETRICS AND GYNECOLOGY | Facility: CLINIC | Age: 45
End: 2025-02-26

## 2025-03-06 ENCOUNTER — CLINICAL SUPPORT (OUTPATIENT)
Dept: OBSTETRICS AND GYNECOLOGY | Facility: CLINIC | Age: 45
End: 2025-03-06
Payer: COMMERCIAL

## 2025-03-06 DIAGNOSIS — N95.1 MENOPAUSAL SYMPTOMS: Primary | ICD-10-CM

## 2025-03-07 NOTE — PROGRESS NOTES
Personal Information    Full Name: Deedee Ramirez 1980    PCP- Dr.Pooja Bagley    Medical History    Do you have a chronic medical condition? (e.g., diabetes, hypertension, thyroid issues)   If yes, please specify:   Yes - Hashimoto's     2.   Do you have a history of hormone- related conditions? (e.g., endometriosis, breast cancer)   If yes, please explain:Chemotherapy 2/2014-6/2014 Radiation 10/2014- Bilateral Silicone & fat expander R. Breast 2015   Yes - TNBC-invasive ductal carcinoma. 2/2013 left breast lumpectomy 7/2014    3.   Have you previously or are you currently taking hormone replacement therapy?   If yes, please list: OTC pre/probiotic   Yes - Will have started vaginal estrogen before she see     Menstrual History    At what age did you begin menstruating?   10    2.   Have you experienced any changes in your menstrual cycle in the last year?   If yes, please describe:   No    3.   When was your last menstrual period or age of last period?   3/1/2025    4.   Have you had a hysterectomy?   If yes, at what age?  No    Symptoms Assesments      Are you experiencing any of the following symptoms:  Hot Flashes: No   Night Sweats: No   Vaginal Dryness or Pain with [place]: Yes   Mood Swings: No   Anxiety or Depression: No   Sleep Disturbances: No   Fatigue: No   Weight Gain: No   Joint or Muscle Pain: No   Memory Issues or Brain Fog: Yes ADHD   Decreased Interest in Sex (Low Libido): No  Colonoscopy: No     Lifestyle Factors    How would you describe your diet?  Balanced    2.   How often do you exercise?   Rarely    3.   Do you smoke or consume alcohol?   If yes, please specify frequency:   Yes - Socially Drinker and Does not smoke     4.   How would you rate your stress levels?   High    Family History    Is there a family history of menopause-related conditions? (e.g., osteoporosis, breast cancer)  If yes, please specify  Yes - Paternal Gr aunt breast cancer    2.   Is there a family  history of heart disease?   If yes, please specify   No          Spoke with patient for a total of 30 minutes during virtual visit.  Patient was guided through expectations and treatment options.     Questions answered. Encouraged to send message or call office with any questions/concerns. Verbalized understanding.       Tiffanie

## 2025-03-13 ENCOUNTER — TELEPHONE (OUTPATIENT)
Dept: OBSTETRICS AND GYNECOLOGY | Facility: CLINIC | Age: 45
End: 2025-03-13
Payer: COMMERCIAL

## 2025-03-25 ENCOUNTER — OFFICE VISIT (OUTPATIENT)
Dept: OBSTETRICS AND GYNECOLOGY | Facility: CLINIC | Age: 45
End: 2025-03-25
Payer: COMMERCIAL

## 2025-03-25 ENCOUNTER — LAB VISIT (OUTPATIENT)
Dept: LAB | Facility: HOSPITAL | Age: 45
End: 2025-03-25
Payer: COMMERCIAL

## 2025-03-25 VITALS — BODY MASS INDEX: 24.04 KG/M2 | WEIGHT: 119.06 LBS

## 2025-03-25 DIAGNOSIS — N90.4 VULVAR DYSTROPHY: ICD-10-CM

## 2025-03-25 DIAGNOSIS — N92.6 IRREGULAR MENSES: Primary | ICD-10-CM

## 2025-03-25 DIAGNOSIS — N92.6 IRREGULAR MENSES: ICD-10-CM

## 2025-03-25 LAB
ESTRADIOL SERPL HS-MCNC: 38 PG/ML
FSH SERPL-ACNC: 10.17 MIU/ML
LH SERPL-ACNC: 3.3 MIU/ML

## 2025-03-25 PROCEDURE — 99999 PR PBB SHADOW E&M-EST. PATIENT-LVL II: CPT | Mod: PBBFAC,,, | Performed by: OBSTETRICS & GYNECOLOGY

## 2025-03-25 PROCEDURE — 82670 ASSAY OF TOTAL ESTRADIOL: CPT

## 2025-03-25 PROCEDURE — 3008F BODY MASS INDEX DOCD: CPT | Mod: CPTII,S$GLB,, | Performed by: OBSTETRICS & GYNECOLOGY

## 2025-03-25 PROCEDURE — 99215 OFFICE O/P EST HI 40 MIN: CPT | Mod: S$GLB,,, | Performed by: OBSTETRICS & GYNECOLOGY

## 2025-03-25 PROCEDURE — 1159F MED LIST DOCD IN RCRD: CPT | Mod: CPTII,S$GLB,, | Performed by: OBSTETRICS & GYNECOLOGY

## 2025-03-25 PROCEDURE — 36415 COLL VENOUS BLD VENIPUNCTURE: CPT

## 2025-03-25 PROCEDURE — 83001 ASSAY OF GONADOTROPIN (FSH): CPT

## 2025-03-25 PROCEDURE — 83002 ASSAY OF GONADOTROPIN (LH): CPT

## 2025-03-25 NOTE — PROGRESS NOTES
Cc:  discuss vaginal atrophy    HPI:  44 yr  who is still having menses albeit starting to space out presents to discuss concerns for vaginal atrophy.  Pt believes that her frequent yeast infections (untested but responsive to fluconazole) are a result of vaginal/vulvar atrophy.  Pt is breast cancer survivor with triple negative breast cancer > 10 yrs ago.  Pt given lotrisone cream for vulvar sx's which only slightly improved vulvar itching.  Pt here to further discuss using external estrogen cream along with Femring.      Pt also requesting to be evaluated for possibility of HSV.  Pt states she was dx'd with HSV blood test but has never had vulvar lesions.      Vitals:    25 1146   Weight: 54 kg (119 lb 0.8 oz)     Physical Exam:   Constitutional: She is oriented to person, place, and time. She appears well-developed and well-nourished. No distress.    HENT:   Head: Normocephalic and atraumatic.     Neck: No thyromegaly present.     Pulmonary/Chest: Effort normal. No respiratory distress.        Abdominal: Soft. She exhibits no distension and no mass. There is no abdominal tenderness. There is no rebound and no guarding.     Genitourinary:    Urethra, bladder, vagina, uterus, right adnexa and left adnexa normal.      Pelvic exam was performed with patient in the lithotomy position.   The external female genitalia was normal.   No external genitalia lesions identified,Genitalia hair distrobution normal .     Labial bartholins normal.There is no rash, tenderness, lesion or injury on the right labia. There is no rash, tenderness, lesion or injury on the left labia. Cervix is normal. Right adnexum displays no mass, no tenderness and no fullness. Left adnexum displays no mass, no tenderness and no fullness. Vagina exhibits no lesion. No erythema, vaginal discharge, tenderness, bleeding, rectocele, cystocele or prolapse of vaginal walls in the vagina.    No foreign body in the vagina.      No signs of  injury in the vagina.   Vagina was moist.Cervix exhibits no motion tenderness, no lesion, no discharge, no friability, no tenderness and no polyp. Normal urethral meatus.Urethral Meatus exhibits: no urethral lesionUrethra findings: no urethral mass, no tenderness, no urethral scarring and no prolapsedBladder findings: no bladder distention and no bladder tenderness   Genitourinary Comments: Female chaperone, Stephanie Estevezvalentin Hardyjennifer,  present for entire exam               Musculoskeletal: Normal range of motion and moves all extremeties. No tenderness.       Neurological: She is alert and oriented to person, place, and time. No cranial nerve deficit. Coordination normal.    Skin: She is not diaphoretic.    Psychiatric: She has a normal mood and affect. Her behavior is normal. Judgment and thought content normal.       Assessment/Plan  1. Irregular menses    2. Vulvar dystrophy      60 minutes spent with pt in discussion and reviewing prior records  Discussed vaginal exam did not look atrophic at this time and vulva also appeared normal.  Dicsussed at length that HSV blood test is an antibody test which can document exposure to HSV, but not actually diagnostic for HSV disease.  Discussed menopausal labs.  If pt already has high E2 level, then addition of estrogen unlikely to be beneficial.  Discussed concerns for adding unopposed estrogen and need for progesterone as well.  Pt has appt with Dr. Davila in 1 month,  pt will f/u with Dr. Narayan after that. Sample of Intrarosa given to use qod for 1 month.  Pt can discuss satisfaction or problems with this med at that time.

## 2025-03-26 ENCOUNTER — RESULTS FOLLOW-UP (OUTPATIENT)
Dept: OBSTETRICS AND GYNECOLOGY | Facility: HOSPITAL | Age: 45
End: 2025-03-26

## 2025-04-24 ENCOUNTER — OFFICE VISIT (OUTPATIENT)
Dept: OBSTETRICS AND GYNECOLOGY | Facility: CLINIC | Age: 45
End: 2025-04-24
Attending: OBSTETRICS & GYNECOLOGY
Payer: COMMERCIAL

## 2025-04-24 VITALS
HEART RATE: 93 BPM | SYSTOLIC BLOOD PRESSURE: 153 MMHG | HEIGHT: 59 IN | BODY MASS INDEX: 24.19 KG/M2 | WEIGHT: 120 LBS | DIASTOLIC BLOOD PRESSURE: 108 MMHG

## 2025-04-24 DIAGNOSIS — Z17.421 TRIPLE NEGATIVE BREAST CANCER: ICD-10-CM

## 2025-04-24 DIAGNOSIS — C50.919 TRIPLE NEGATIVE BREAST CANCER: ICD-10-CM

## 2025-04-24 DIAGNOSIS — N76.0 RECURRENT VAGINITIS: Primary | ICD-10-CM

## 2025-04-24 DIAGNOSIS — N95.8 GENITOURINARY SYNDROME OF MENOPAUSE: ICD-10-CM

## 2025-04-24 PROCEDURE — 99999 PR PBB SHADOW E&M-EST. PATIENT-LVL III: CPT | Mod: PBBFAC,,, | Performed by: OBSTETRICS & GYNECOLOGY

## 2025-04-24 NOTE — PROGRESS NOTES
"SUBJECTIVE:   44 y.o. female  presents today to establish care in survivorship and discuss vaginal symptoms.  Patient's last menstrual period was 2025..  She has seen 2 different GYNs and has questions about her diagnosis and treatment. .    She had Triple neg breast cancer diagnosed Dec 2013- had chemo, XRT and lumpectomy- had tissue expander . She had breast implants prior to this. She ended up doing fat transfer and silicone transplants.    Froze embryos and had pregnancy from IVF  Both of her children are special needs- autism  She reports 3 yeast infections - took multiple doses of Fluconazole. She reprots external burning and itching sensations. Tissue can get red and "feels like sunburn in the crease of my leg". One GYN thinks it is vaginal atrophy and one thought it was dermatitis.   Labs were drawn and she is not menopausal.   She reports monthly cycle. She uses "disk" and it does leak, otherwise she uses tampons.   Has had some cycles that are 12-18 days late.   She has also used Clotrimazole  and Betamethasone occasionally  She has questions about hormonal vaginal treatments.   She has been using oral supplements by [employer] and [employer]. One of them contains multiple adaptogens including Gingko and Carmela.   She reports occasional hot flashes and night sweats  She changed to silicone based lubricant    Past Medical History:   Diagnosis Date    Abnormal Pap smear of cervix     ADHD (attention deficit hyperactivity disorder)     Anxiety     Breast cancer     Depression     Hashimoto's     PTSD (post-traumatic stress disorder)      Past Surgical History:   Procedure Laterality Date    CERVICAL BIOPSY  W/ LOOP ELECTRODE EXCISION      COLPOSCOPY      in her 20s     Social History[1]  Family History   Problem Relation Name Age of Onset    Hypertension Mother      Depression Mother      Hyperlipidemia Mother      Pancreatic cancer Maternal Uncle      Colon cancer Neg Hx      Ovarian cancer Neg Hx      " Breast cancer Neg Hx      Uterine cancer Neg Hx      Cervical cancer Neg Hx      Cancer Neg Hx       OB History    Para Term  AB Living   3 2   1 2   SAB IAB Ectopic Multiple Live Births             # Outcome Date GA Lbr Carl/2nd Weight Sex Type Anes PTL Lv   3 Para 2017     Vag-Spont      2 Para 2013     Vag-Spont      1 AB                    Current Medications[2]  Allergies: Adhesive tape-silicones, Bacitracin-polymyxin b, Modafinil, Amoxicillin, Bacitracin zinc-polymyxin b, Doxycycline, and Hydrocodone-acetaminophen     The 10-year ASCVD risk score (Orlin NEGRON, et al., 2019) is: 0.6%    Values used to calculate the score:      Age: 44 years      Sex: Female      Is Non- : No      Diabetic: No      Tobacco smoker: No      Systolic Blood Pressure: 153 mmHg      Is BP treated: No      HDL Cholesterol: 70 mg/dL      Total Cholesterol: 159 mg/dL      ROS:  Constitutional: no weight loss, weight gain, fever, fatigue  Eyes:  No vision changes, glasses/contacts  ENT/Mouth: No ulcers, sinus problems, ears ringing, headache  Cardiovascular: No inability to lie flat, chest pain, exercise intolerance, swelling, heart palpitations  Respiratory: No wheezing, coughing blood, shortness of breath, or cough  Gastrointestinal: No diarrhea, bloody stool, nausea/vomiting, constipation, gas, hemorrhoids  Genitourinary: No blood in urine, painful urination, urgency of urination, frequency of urination, incomplete emptying, incontinence, abnormal bleeding, painful periods, heavy periods, vaginal discharge, vaginal odor, painful intercourse, sexual problems, bleeding after intercourse, +vaginal itching.  Musculoskeletal: No muscle weakness  Skin/Breast: No painful breasts, nipple discharge, masses, rash, ulcers  Neurological: No passing out, seizures, numbness, headache  Endocrine: No diabetes, +hypothyroid, hyperthyroid, hot flashes, hair loss, abnormal hair growth, acne  Psychiatric: No depression,  crying  Hematologic: No bruises, bleeding, swollen lymph nodes, anemia.      Physical Exam    General- well developed, well nourished  Vulva- no masses, no lesions, no erythema  Vagina-  no masses, no lesions, No evidence of atrophy, no vaginal discharge  Cervix- no Cervical motion tenderness, no lesions      ASSESSMENT:   1. Genitourinary syndrome of menopause  Ambulatory referral/consult to Women's Wellness and Survivorship      2. Triple negative breast cancer  Ambulatory referral/consult to Women's Wellness and Survivorship        3. Recurrent vaginitis    PLAN:       Counseled her to discuss Rebekah supplement with Endocrinology regarding Carmela  Counseled her that she does not have any evidence of vaginal atrophy at this time.  Counseled her that she also has no evidence of yeast at this time.  Counseled her that if she does develop a yeast infection it is best that she be seen for an exam.  Counseled her on the use of vaginal moisturizer which has hyaluronic acid in it which would help maintain the pH balance in help prevent yeast infections.  Counseled her that I do not think she needs to use intra Leatha at this time but perimenopause as the time of hormonal fluctuations and things can change as she goes through perimenopause  Counseled on use of silicone lubricant- [employer] lube  Do not recommend flavored lubricants or warmed lubricants. For Water based lubricant recommend GCL Almost Naked  All of her questions were answered  Total time 75 minutes.  Face-to-face time 65 minutes , review of medical record and arranging follow-up       [1]   Social History  Socioeconomic History    Marital status:     Number of children: 2   Tobacco Use    Smoking status: Never    Smokeless tobacco: Never   Substance and Sexual Activity    Alcohol use: Yes    Drug use: Never    Sexual activity: Yes     Partners: Male     Birth control/protection: Condom   [2]   Current Outpatient Medications   Medication Sig Dispense Refill     clotrimazole-betamethasone 1-0.05% (LOTRISONE) cream Apply topically 2 (two) times daily. 45 g 1    levothyroxine (SYNTHROID) 75 MCG tablet Take 1 tablet (75 mcg total) by mouth before breakfast. 90 tablet 3     No current facility-administered medications for this visit.

## 2025-09-04 DIAGNOSIS — N76.0 ACUTE VULVOVAGINITIS: ICD-10-CM

## 2025-09-05 RX ORDER — CLOTRIMAZOLE AND BETAMETHASONE DIPROPIONATE 10; .64 MG/G; MG/G
CREAM TOPICAL 2 TIMES DAILY
Qty: 45 G | Refills: 1 | Status: SHIPPED | OUTPATIENT
Start: 2025-09-05